# Patient Record
Sex: FEMALE | Race: WHITE
[De-identification: names, ages, dates, MRNs, and addresses within clinical notes are randomized per-mention and may not be internally consistent; named-entity substitution may affect disease eponyms.]

---

## 2020-02-15 ENCOUNTER — HOSPITAL ENCOUNTER (INPATIENT)
Dept: HOSPITAL 4 - SED | Age: 55
LOS: 5 days | Discharge: NURSING, REHAB OR LONG-TERM CARE | DRG: 871 | End: 2020-02-20
Attending: INTERNAL MEDICINE | Admitting: INTERNAL MEDICINE
Payer: COMMERCIAL

## 2020-02-15 VITALS — WEIGHT: 114 LBS | BODY MASS INDEX: 20.98 KG/M2 | HEIGHT: 62 IN

## 2020-02-15 VITALS — SYSTOLIC BLOOD PRESSURE: 135 MMHG

## 2020-02-15 VITALS — SYSTOLIC BLOOD PRESSURE: 98 MMHG

## 2020-02-15 DIAGNOSIS — G80.9: ICD-10-CM

## 2020-02-15 DIAGNOSIS — K21.9: ICD-10-CM

## 2020-02-15 DIAGNOSIS — N13.6: ICD-10-CM

## 2020-02-15 DIAGNOSIS — N28.82: ICD-10-CM

## 2020-02-15 DIAGNOSIS — Z88.0: ICD-10-CM

## 2020-02-15 DIAGNOSIS — Z79.899: ICD-10-CM

## 2020-02-15 DIAGNOSIS — G93.40: ICD-10-CM

## 2020-02-15 DIAGNOSIS — M06.9: ICD-10-CM

## 2020-02-15 DIAGNOSIS — M32.9: ICD-10-CM

## 2020-02-15 DIAGNOSIS — Z90.710: ICD-10-CM

## 2020-02-15 DIAGNOSIS — J15.6: ICD-10-CM

## 2020-02-15 DIAGNOSIS — Z74.01: ICD-10-CM

## 2020-02-15 DIAGNOSIS — A41.9: Primary | ICD-10-CM

## 2020-02-15 DIAGNOSIS — Z87.440: ICD-10-CM

## 2020-02-15 DIAGNOSIS — L89.312: ICD-10-CM

## 2020-02-15 DIAGNOSIS — Z16.30: ICD-10-CM

## 2020-02-15 DIAGNOSIS — Z88.8: ICD-10-CM

## 2020-02-15 LAB
ALBUMIN SERPL BCP-MCNC: 2.5 G/DL (ref 3.4–4.8)
ALT SERPL W P-5'-P-CCNC: 44 U/L (ref 12–78)
AMYLASE SERPL-CCNC: 41 U/L (ref 0–100)
ANION GAP SERPL CALCULATED.3IONS-SCNC: 9 MMOL/L (ref 5–15)
AST SERPL W P-5'-P-CCNC: 18 U/L (ref 10–37)
BASOPHILS NFR BLD MANUAL: 0 % (ref 0–2)
BILIRUB SERPL-MCNC: 0.3 MG/DL (ref 0–1)
BUN SERPL-MCNC: 22 MG/DL (ref 8–21)
CALCIUM SERPL-MCNC: 9.2 MG/DL (ref 8.4–11)
CHLORIDE SERPL-SCNC: 98 MMOL/L (ref 98–107)
CREAT SERPL-MCNC: 0.8 MG/DL (ref 0.55–1.3)
EOSINOPHIL NFR BLD MANUAL: 0 % (ref 0–7)
ERYTHROCYTE [DISTWIDTH] IN BLOOD BY AUTOMATED COUNT: 15.5 % (ref 9–15)
GFR SERPL CREATININE-BSD FRML MDRD: 96 ML/MIN (ref 90–?)
GLUCOSE SERPL-MCNC: 105 MG/DL (ref 70–99)
HCT VFR BLD AUTO: 35.5 % (ref 36–48)
HGB BLD-MCNC: 11.7 G/DL (ref 12–16)
INR PPP: 1.1 (ref 0.8–1.2)
LIPASE SERPL-CCNC: 101 U/L (ref 73–393)
LYMPHOCYTES NFR BLD MANUAL: 22 % (ref 20–46)
MCH RBC QN AUTO: 29 PG (ref 27–31)
MCHC RBC AUTO-ENTMCNC: 33 % (ref 32–36)
MCV RBC AUTO: 87 FL (ref 79–98)
MONOCYTES # BLD MANUAL: 21 % (ref 0–11)
NEUTS BAND NFR BLD MANUAL: 4 % (ref 0–6)
PLATELET # BLD AUTO: 306 K/UL (ref 130–430)
POTASSIUM SERPL-SCNC: 3.8 MMOL/L (ref 3.5–5.1)
PROTHROMBIN TIME: 10.7 SECS (ref 9.5–12.5)
RBC # BLD AUTO: 4.07 MIL/UL (ref 4.2–6.2)
SODIUM SERPLBLD-SCNC: 131 MMOL/L (ref 136–145)
VARIANT LYMPHS NFR BLD MANUAL: 1 % (ref 0–0)
WBC # BLD AUTO: 10.4 K/UL (ref 4.8–10.8)

## 2020-02-15 PROCEDURE — G0378 HOSPITAL OBSERVATION PER HR: HCPCS

## 2020-02-15 RX ADMIN — SODIUM CHLORIDE SCH MLS/HR: 9 INJECTION, SOLUTION INTRAVENOUS at 18:34

## 2020-02-15 RX ADMIN — IPRATROPIUM BROMIDE AND ALBUTEROL SULFATE SCH ML: .5; 3 SOLUTION RESPIRATORY (INHALATION) at 23:31

## 2020-02-15 NOTE — NUR
ADMISSION NOTE

Received patient from ER via gurney. Patient admitted with diagnosis of PNA. Patient is 
awake, alert, oriented X 0. Patient oriented to hospital room, call light, toileting, pain 
management and safety-teach back done. Patient informed that BRET will be HER nurse and 
that their room number is 122B. Personal belongings checked and Belongings List documented. 
Call light within reach.

## 2020-02-15 NOTE — NUR
Gave report to RENEA Arias. Patient's summary report, code status form and 
belongins all completed and printed. Unable to straight cath pt to obtain urine 
specimen. Patient in bed, awake and resting. No s/s of acute distress noted. 
Patient unable to be transferred at this time, waiting for unit to accept 
patient. endorsed care to RENEA Arias.

## 2020-02-15 NOTE — NUR
Transfer to telemtry unit via ACLS protocol. Licensed nurse present. IV present 
no signs or symptoms of infiltration.

## 2020-02-15 NOTE — NUR
attempted to straight cath for urine sample x2 by two different RN's including 
charge rn. attempts unsuccessful. per md, let patient rest and attempt later.

## 2020-02-15 NOTE — NUR
Received sbar report from RENEA Lagos. Pt in no signs of acute distress or 
discomfort noted. Will cont to monitor pt.

## 2020-02-15 NOTE — NUR
Pt in bed with eyes open resting comfortably. No signs of acute distress or 
discomfort noted. Will cont to monitor pt.

## 2020-02-15 NOTE — NUR
Report received from RENEA Flores, BIB ambulance from [facility]. Patient had 
fever of 101.6 at 1130 today. patient given tyelnol at [facility]. pt history 
of pneumonia, bronchitis and UTI. Patient afebrile on arrival. No s/s of acute 
distress noted. Will continue to monitor.

## 2020-02-16 VITALS — SYSTOLIC BLOOD PRESSURE: 108 MMHG

## 2020-02-16 VITALS — SYSTOLIC BLOOD PRESSURE: 116 MMHG

## 2020-02-16 VITALS — SYSTOLIC BLOOD PRESSURE: 97 MMHG

## 2020-02-16 VITALS — SYSTOLIC BLOOD PRESSURE: 93 MMHG

## 2020-02-16 VITALS — SYSTOLIC BLOOD PRESSURE: 91 MMHG

## 2020-02-16 VITALS — SYSTOLIC BLOOD PRESSURE: 98 MMHG

## 2020-02-16 LAB
ALBUMIN SERPL BCP-MCNC: 2.1 G/DL (ref 3.4–4.8)
ALT SERPL W P-5'-P-CCNC: 34 U/L (ref 12–78)
ANION GAP SERPL CALCULATED.3IONS-SCNC: 9 MMOL/L (ref 5–15)
APPEARANCE UR: (no result)
AST SERPL W P-5'-P-CCNC: 22 U/L (ref 10–37)
BASOPHILS # BLD AUTO: 0.2 K/UL (ref 0–0.2)
BASOPHILS NFR BLD AUTO: 1.6 % (ref 0–2)
BILIRUB SERPL-MCNC: 0.2 MG/DL (ref 0–1)
BILIRUB UR QL STRIP: NEGATIVE
BUN SERPL-MCNC: 14 MG/DL (ref 8–21)
CALCIUM SERPL-MCNC: 8.3 MG/DL (ref 8.4–11)
CHLORIDE SERPL-SCNC: 105 MMOL/L (ref 98–107)
COLOR UR: YELLOW
CREAT SERPL-MCNC: 0.53 MG/DL (ref 0.55–1.3)
EOSINOPHIL # BLD AUTO: 0.1 K/UL (ref 0–0.4)
EOSINOPHIL NFR BLD AUTO: 0.6 % (ref 0–4)
ERYTHROCYTE [DISTWIDTH] IN BLOOD BY AUTOMATED COUNT: 15.4 % (ref 9–15)
GFR SERPL CREATININE-BSD FRML MDRD: 154 ML/MIN (ref 90–?)
GLUCOSE SERPL-MCNC: 92 MG/DL (ref 70–99)
GLUCOSE UR STRIP-MCNC: NEGATIVE MG/DL
HCT VFR BLD AUTO: 34.2 % (ref 36–48)
HGB BLD-MCNC: 11.1 G/DL (ref 12–16)
HGB UR QL STRIP: (no result)
KETONES UR STRIP-MCNC: NEGATIVE MG/DL
LEUKOCYTE ESTERASE UR QL STRIP: (no result)
LYMPHOCYTES # BLD AUTO: 1.8 K/UL (ref 1–5.5)
LYMPHOCYTES NFR BLD AUTO: 16.9 % (ref 20.5–51.5)
MCH RBC QN AUTO: 29 PG (ref 27–31)
MCHC RBC AUTO-ENTMCNC: 32 % (ref 32–36)
MCV RBC AUTO: 89 FL (ref 79–98)
MONOCYTES # BLD MANUAL: 19.4 % (ref 1.7–9.3)
MONOCYTES # BLD MANUAL: 2.1 K/UL (ref 0–1)
NEUTROPHILS # BLD AUTO: 6.6 K/UL (ref 1.8–7.7)
NEUTROPHILS NFR BLD AUTO: 61.5 % (ref 40–70)
NITRITE UR QL STRIP: NEGATIVE
PH UR STRIP: 6 [PH] (ref 5–8)
PLATELET # BLD AUTO: 274 K/UL (ref 130–430)
POTASSIUM SERPL-SCNC: 3.8 MMOL/L (ref 3.5–5.1)
PROT UR QL STRIP: NEGATIVE
RBC # BLD AUTO: 3.84 MIL/UL (ref 4.2–6.2)
SODIUM SERPLBLD-SCNC: 137 MMOL/L (ref 136–145)
SP GR UR STRIP: 1.01 (ref 1–1.03)
UROBILINOGEN UR STRIP-MCNC: 0.2 MG/DL (ref 0.2–1)
WBC # BLD AUTO: 10.8 K/UL (ref 4.8–10.8)

## 2020-02-16 RX ADMIN — PREDNISONE SCH MG: 5 TABLET ORAL at 11:11

## 2020-02-16 RX ADMIN — ACETAMINOPHEN PRN MG: 325 TABLET ORAL at 15:44

## 2020-02-16 RX ADMIN — Medication SCH UNIT: at 11:11

## 2020-02-16 RX ADMIN — IPRATROPIUM BROMIDE AND ALBUTEROL SULFATE SCH ML: .5; 3 SOLUTION RESPIRATORY (INHALATION) at 22:40

## 2020-02-16 RX ADMIN — HYDROXYCHLOROQUINE SULFATE SCH MG: 200 TABLET ORAL at 09:00

## 2020-02-16 RX ADMIN — Medication SCH MG: at 21:58

## 2020-02-16 RX ADMIN — Medication SCH MG: at 15:37

## 2020-02-16 RX ADMIN — IPRATROPIUM BROMIDE AND ALBUTEROL SULFATE SCH ML: .5; 3 SOLUTION RESPIRATORY (INHALATION) at 07:51

## 2020-02-16 RX ADMIN — IPRATROPIUM BROMIDE AND ALBUTEROL SULFATE SCH ML: .5; 3 SOLUTION RESPIRATORY (INHALATION) at 11:26

## 2020-02-16 RX ADMIN — IPRATROPIUM BROMIDE AND ALBUTEROL SULFATE SCH ML: .5; 3 SOLUTION RESPIRATORY (INHALATION) at 19:16

## 2020-02-16 RX ADMIN — OXYBUTYNIN CHLORIDE SCH MG: 5 TABLET ORAL at 11:10

## 2020-02-16 RX ADMIN — AZITHROMYCIN DIHYDRATE SCH MG: 250 TABLET, FILM COATED ORAL at 11:11

## 2020-02-16 RX ADMIN — Medication SCH TAB: at 11:11

## 2020-02-16 RX ADMIN — ACETAMINOPHEN PRN MG: 325 TABLET ORAL at 22:55

## 2020-02-16 RX ADMIN — IPRATROPIUM BROMIDE AND ALBUTEROL SULFATE SCH ML: .5; 3 SOLUTION RESPIRATORY (INHALATION) at 15:08

## 2020-02-16 RX ADMIN — OXYCODONE HYDROCHLORIDE AND ACETAMINOPHEN SCH MG: 500 TABLET ORAL at 11:11

## 2020-02-16 RX ADMIN — SODIUM CHLORIDE SCH MLS/HR: 9 INJECTION, SOLUTION INTRAVENOUS at 15:41

## 2020-02-16 RX ADMIN — SODIUM CHLORIDE SCH MLS/HR: 9 INJECTION, SOLUTION INTRAVENOUS at 04:31

## 2020-02-16 RX ADMIN — MULTIVITAMIN TABLET SCH TAB: TABLET at 11:11

## 2020-02-16 RX ADMIN — Medication SCH MG: at 11:11

## 2020-02-16 RX ADMIN — Medication SCH TAB: at 21:58

## 2020-02-16 RX ADMIN — Medication SCH CAP: at 11:11

## 2020-02-16 RX ADMIN — Medication SCH CAP: at 21:58

## 2020-02-16 RX ADMIN — IPRATROPIUM BROMIDE AND ALBUTEROL SULFATE SCH ML: .5; 3 SOLUTION RESPIRATORY (INHALATION) at 04:09

## 2020-02-16 NOTE — NUR
Nutrition Update



Reynold Scale 13 noted.

Pt admitted for PNA

Diet: Regular

BMI: 20.9 kg/m2



RD to follow per nutrition care standards.

## 2020-02-16 NOTE — NUR
UNABLE TO INSERT ALFREDO CATH USING 14FR CATH. WILL ENDORSE TO NIGHT NURSE. 

DR WAGNER (URO) WILL SEE PT IN AM.

## 2020-02-16 NOTE — NUR
ID Consultation Paged



Reason for consultation: PNA

Was consult called: Yes

Person who was notified: Charla

Consulting Physician: Dr Olson

Consultant Phone Number: [phone]

Consultant Specialty: Infectious Disease

Ordered By: Dr Martinez

## 2020-02-16 NOTE — NUR
CONSULTATION PAGED/CALLED

Reason for Consultation: []   HYDRONEPHROSIS

Person Who was Notified: []  JOSELYN

Consulting Physician: []   MARTIN MARCELINO

Consultant Specialty: []  UROLOGY

Ordering Physician: []  DR LLOYD

## 2020-02-16 NOTE — NUR
Fever/Dr. Crow at bedside

Patient had temperature of 100.7. PRN Tylenol given. Educated the action and side effects of 
medications. Patient half cup of chocolate pudding and tolerated well. Dr. Crow at bedside 
and inserted 16 F orozco catheter with 200 ml of immediate urine return. Urine sample sent to 
lab. Informed MD about patient's low BP. New orders received from MD.

## 2020-02-16 NOTE — NUR
Uro Consultation Paged



Reason for consultation: UTI

Was consult called: Yes

Person who was notified: Charla

Consulting Physician: Dr Gomes

Consultant Phone Number: [phone]

Consultant Specialty: Urology

Ordered By: Dr Martinez

## 2020-02-16 NOTE — NUR
CLOSING NOTES

Patient is resting in bed, eyes closed, breathing evenly and nonlabored on 2L of oxygen via 
NC. Needs met throughout the shift. No s/s of distress at this time, fall/safety/aspiration 
precautions, will endorse care to morning shift RN.

## 2020-02-16 NOTE — NUR
UNABLE TO INSERT ALFREDO, DR. GOMES UNABLE TO ACCEPT PATIENT

Patient is resting in bed, awake, breathing evenly and nonlabored on 2L of oxygen via NC. 
Educated patient on need for alfredo catheter, patient unable to state understanding due to 
cognitive limitations. Attempt to insert alfredo catheter to patient unsuccessful. Spoke with 
Dr. Gomes, he is unable to accept patient for urology consult, he referred Dr. Abdoulaye Crow 
instead. No s/s of distress at this time, fall/safety/aspiration precautions, will continue 
to monitor. No complaints No complaints

## 2020-02-16 NOTE — NUR
ROUNDS

Patient is resting in bed, eyes closed, breathing evenly and nonlabored on 2L of oxygen via 
NC. No s/s of distress at this time, fall/safety/aspiration precautions, will continue to 
monitor.

## 2020-02-16 NOTE — NUR
IVF STARTED, NEW IV STARTED

Patient is resting in bed, eyes closed, breathing evenly and nonlabored on 2L of oxygen via 
NC. New IV started on the right hand 22g, IVF started and running, patient is tolerating it 
well. Patient has and IV on the left wrist 22g SL. No s/s of distress at this time, 
fall/safety/aspiration precautions, will continue to monitor.

## 2020-02-16 NOTE — NUR
Medications

given, crushed with applesauce. Educated the action and side effects of medications. Patient 
tolerated well and ate 1 applesauce. Hygiene care provided for patient. Patient screams when 
turned. Call light with the patient. Safety precautions in place.

## 2020-02-16 NOTE — NUR
Pulmo Consultation Paged



Reason for consultation: Bilateral PNA

Was consult called: Yes

Person who was notified: Charla

Consulting Physician: Dr Lau

Consultant Phone Number: [phone]

Consultant Specialty: Pulmo

Ordered By: Dr Martinez

## 2020-02-16 NOTE — NUR
CLOSING: PT HAS BEEN STABLE, EXCEPT FOR FEVER .8 . PT ABLE TO EAT BETTER WITH PUREED 
DIET.  WILL ENDORSE TO NIGHT NURSE. MEDS GIVEN CRUSHED WITH APPLESAUCE. 

-------------------------------------------------------------------------------

Addendum: 02/16/20 at 1835 by Ovidio Rivera RN

-------------------------------------------------------------------------------

PT GIVEN IV ANTIBIOTICS AS ORDERED.

## 2020-02-16 NOTE — NUR
Opening notes

Received report. Patient is resting in bed. No signs of distress noted 2 L NC. Breathing 
even and unlabored. IV patent and intact, no signs of infiltration noted. Dr. Crow called 
stating he will come in tonight to see patient. no other needs. Call light with the patient. 
Safety precautions in place.

## 2020-02-17 VITALS — SYSTOLIC BLOOD PRESSURE: 114 MMHG

## 2020-02-17 VITALS — SYSTOLIC BLOOD PRESSURE: 126 MMHG

## 2020-02-17 VITALS — SYSTOLIC BLOOD PRESSURE: 107 MMHG

## 2020-02-17 VITALS — SYSTOLIC BLOOD PRESSURE: 125 MMHG

## 2020-02-17 LAB
ANION GAP SERPL CALCULATED.3IONS-SCNC: 6 MMOL/L (ref 5–15)
BACTERIA URNS QL MICRO: (no result) /HPF
BASOPHILS NFR BLD MANUAL: 0 % (ref 0–2)
BUN SERPL-MCNC: 17 MG/DL (ref 8–21)
CALCIUM SERPL-MCNC: 8.5 MG/DL (ref 8.4–11)
CHLORIDE SERPL-SCNC: 107 MMOL/L (ref 98–107)
CREAT SERPL-MCNC: 0.71 MG/DL (ref 0.55–1.3)
EOSINOPHIL NFR BLD MANUAL: 0 % (ref 0–7)
ERYTHROCYTE [DISTWIDTH] IN BLOOD BY AUTOMATED COUNT: 15 % (ref 9–15)
GFR SERPL CREATININE-BSD FRML MDRD: 110 ML/MIN (ref 90–?)
GLUCOSE SERPL-MCNC: 94 MG/DL (ref 70–99)
HCT VFR BLD AUTO: 31.1 % (ref 36–48)
HGB BLD-MCNC: 10.2 G/DL (ref 12–16)
LYMPHOCYTES NFR BLD MANUAL: 45 % (ref 20–46)
MCH RBC QN AUTO: 29 PG (ref 27–31)
MCHC RBC AUTO-ENTMCNC: 33 % (ref 32–36)
MCV RBC AUTO: 89 FL (ref 79–98)
MONOCYTES # BLD MANUAL: 13 % (ref 0–11)
PLATELET # BLD AUTO: 261 K/UL (ref 130–430)
POTASSIUM SERPL-SCNC: 3.4 MMOL/L (ref 3.5–5.1)
RBC # BLD AUTO: 3.5 MIL/UL (ref 4.2–6.2)
SODIUM SERPLBLD-SCNC: 135 MMOL/L (ref 136–145)
WBC # BLD AUTO: 7.8 K/UL (ref 4.8–10.8)
WBC #/AREA URNS HPF: >100 /HPF (ref 0–3)

## 2020-02-17 RX ADMIN — Medication SCH MG: at 14:41

## 2020-02-17 RX ADMIN — Medication SCH MG: at 21:47

## 2020-02-17 RX ADMIN — Medication SCH CAP: at 08:46

## 2020-02-17 RX ADMIN — IPRATROPIUM BROMIDE AND ALBUTEROL SULFATE SCH ML: .5; 3 SOLUTION RESPIRATORY (INHALATION) at 23:14

## 2020-02-17 RX ADMIN — PREDNISONE SCH MG: 5 TABLET ORAL at 08:46

## 2020-02-17 RX ADMIN — DEXTROSE SCH MLS/HR: 50 INJECTION, SOLUTION INTRAVENOUS at 21:46

## 2020-02-17 RX ADMIN — Medication SCH TAB: at 21:47

## 2020-02-17 RX ADMIN — IPRATROPIUM BROMIDE AND ALBUTEROL SULFATE SCH ML: .5; 3 SOLUTION RESPIRATORY (INHALATION) at 20:04

## 2020-02-17 RX ADMIN — IPRATROPIUM BROMIDE AND ALBUTEROL SULFATE SCH ML: .5; 3 SOLUTION RESPIRATORY (INHALATION) at 15:27

## 2020-02-17 RX ADMIN — SODIUM CHLORIDE SCH MLS/HR: 9 INJECTION, SOLUTION INTRAVENOUS at 21:48

## 2020-02-17 RX ADMIN — Medication SCH UNIT: at 08:46

## 2020-02-17 RX ADMIN — IPRATROPIUM BROMIDE AND ALBUTEROL SULFATE SCH ML: .5; 3 SOLUTION RESPIRATORY (INHALATION) at 07:35

## 2020-02-17 RX ADMIN — SODIUM CHLORIDE SCH MLS/HR: 9 INJECTION, SOLUTION INTRAVENOUS at 14:39

## 2020-02-17 RX ADMIN — Medication SCH MG: at 08:46

## 2020-02-17 RX ADMIN — IPRATROPIUM BROMIDE AND ALBUTEROL SULFATE SCH ML: .5; 3 SOLUTION RESPIRATORY (INHALATION) at 04:05

## 2020-02-17 RX ADMIN — HYDROXYCHLOROQUINE SULFATE SCH MG: 200 TABLET ORAL at 08:45

## 2020-02-17 RX ADMIN — Medication SCH CAP: at 21:47

## 2020-02-17 RX ADMIN — SODIUM CHLORIDE SCH MLS/HR: 9 INJECTION, SOLUTION INTRAVENOUS at 00:52

## 2020-02-17 RX ADMIN — IPRATROPIUM BROMIDE AND ALBUTEROL SULFATE SCH ML: .5; 3 SOLUTION RESPIRATORY (INHALATION) at 11:21

## 2020-02-17 RX ADMIN — OXYCODONE HYDROCHLORIDE AND ACETAMINOPHEN SCH MG: 500 TABLET ORAL at 08:46

## 2020-02-17 RX ADMIN — Medication SCH TAB: at 08:46

## 2020-02-17 RX ADMIN — AZITHROMYCIN DIHYDRATE SCH MG: 250 TABLET, FILM COATED ORAL at 08:45

## 2020-02-17 RX ADMIN — OXYBUTYNIN CHLORIDE SCH MG: 5 TABLET ORAL at 08:46

## 2020-02-17 RX ADMIN — MULTIVITAMIN TABLET SCH TAB: TABLET at 08:46

## 2020-02-17 NOTE — NUR
Closing notes

Patient sleeping at this time. No signs of distress noted. Breathing even and unlabored on 2 
L NC. IV patent and intact, infusing fluids. Goode catheter in place, draining yellow urine. 
All needs met throughout the shift. Call light with the patient. Safety precautions in 
place. Will endorse care to day shift RN.

## 2020-02-17 NOTE — NUR
Note

Pt resting in bed with tele unit attached and intact all shift. Pt was checked on q1' and 
PRN all shift for needs and care. IV in right hand intact and patent infusing IVF's well. 
Goode catheter intact and draining. No needs noted. Report given to NOC RN for continuation 
of care. Call light within reach. Pt was maintained with isolation precautions all shift 
since 0915am.

## 2020-02-17 NOTE — NUR
Opening note

Received patient resting in bed awake, AO x1 to name, she is nonverbal. Respirations are 22 
on 2L NC. IVF is infusing via IV to right hand, IV to left wrist is saline locked. Bed rails 
up 3x, bed is locked in lowest position, and bed alarm on. She has a orozco catheter and 
drainage bag is free of dependent loops and draining to gravity.

## 2020-02-17 NOTE — NUR
Dietitian Recommendations 

*Continue: pureed diet per MD orders. 

ONS Ensure Enlive comes standard w/ diet and provides additional 1050 

kcal, 60 gm protein daily.



Please see Nutritional Assessment for details.

FABIAN, RD

## 2020-02-17 NOTE — NUR
Note

Pt sitting up in bed being assisted in eating her breakfast. IV in right hand intact and 
patent infusing IVF's well. Left wrist IV intact and patent. Tele unit attached and intact 
at this time. No needs noted. Call light within reach.

## 2020-02-17 NOTE — NUR
Resting

Patient resting in bed. No signs of distress noted. Breathing even and unlabored. IVF 
infusing well. Temp at this time 99.0. Influenza screen collected and sent to lab. Call 
light with the patient. Safety precautions in place.

## 2020-02-17 NOTE — NUR
Hygiene care provided

Patient tolerated well. No signs of distress noted. Breathing even and unlabored. IVF 
infusing well. Call light with the patient. Safety precautions in place.

## 2020-02-17 NOTE — NUR
Note

Dr Martinez was paged at 0920am to notify MD that pt testd positive for MRSA in nares. Waiting 
for call back.

## 2020-02-17 NOTE — NUR
Medications

Patient was given due medications crushed and mixed in applesauce, patient was cooperative 
and willing to take meds.  Maxipime, antibiotic administered and infusing well. Patient 
repositioned for comfort.

## 2020-02-17 NOTE — NUR
Note

Pt resting in bed, no needs noted at this time. Call light within reach. IVF's infusing well 
at this time through right hand IV site. Call light within reach all shift.

## 2020-02-18 VITALS — SYSTOLIC BLOOD PRESSURE: 106 MMHG

## 2020-02-18 VITALS — SYSTOLIC BLOOD PRESSURE: 99 MMHG

## 2020-02-18 VITALS — SYSTOLIC BLOOD PRESSURE: 103 MMHG

## 2020-02-18 VITALS — SYSTOLIC BLOOD PRESSURE: 130 MMHG

## 2020-02-18 VITALS — SYSTOLIC BLOOD PRESSURE: 94 MMHG

## 2020-02-18 LAB
ALBUMIN SERPL BCP-MCNC: 2 G/DL (ref 3.4–4.8)
ALT SERPL W P-5'-P-CCNC: 71 U/L (ref 12–78)
ANION GAP SERPL CALCULATED.3IONS-SCNC: 9 MMOL/L (ref 5–15)
AST SERPL W P-5'-P-CCNC: 54 U/L (ref 10–37)
BASOPHILS # BLD AUTO: 0 K/UL (ref 0–0.2)
BASOPHILS NFR BLD AUTO: 0.1 % (ref 0–2)
BILIRUB SERPL-MCNC: 0.2 MG/DL (ref 0–1)
BUN SERPL-MCNC: 10 MG/DL (ref 8–21)
CALCIUM SERPL-MCNC: 8.7 MG/DL (ref 8.4–11)
CHLORIDE SERPL-SCNC: 105 MMOL/L (ref 98–107)
CREAT SERPL-MCNC: 0.64 MG/DL (ref 0.55–1.3)
EOSINOPHIL # BLD AUTO: 0.1 K/UL (ref 0–0.4)
EOSINOPHIL NFR BLD AUTO: 1.1 % (ref 0–4)
ERYTHROCYTE [DISTWIDTH] IN BLOOD BY AUTOMATED COUNT: 15.6 % (ref 9–15)
GFR SERPL CREATININE-BSD FRML MDRD: 124 ML/MIN (ref 90–?)
GLUCOSE SERPL-MCNC: 97 MG/DL (ref 70–99)
HCT VFR BLD AUTO: 30.1 % (ref 36–48)
HGB BLD-MCNC: 9.8 G/DL (ref 12–16)
LYMPHOCYTES # BLD AUTO: 2.1 K/UL (ref 1–5.5)
LYMPHOCYTES NFR BLD AUTO: 23.5 % (ref 20.5–51.5)
MCH RBC QN AUTO: 29 PG (ref 27–31)
MCHC RBC AUTO-ENTMCNC: 33 % (ref 32–36)
MCV RBC AUTO: 88 FL (ref 79–98)
MONOCYTES # BLD MANUAL: 1 K/UL (ref 0–1)
MONOCYTES # BLD MANUAL: 11.6 % (ref 1.7–9.3)
NEUTROPHILS # BLD AUTO: 5.7 K/UL (ref 1.8–7.7)
NEUTROPHILS NFR BLD AUTO: 63.7 % (ref 40–70)
PLATELET # BLD AUTO: 281 K/UL (ref 130–430)
POTASSIUM SERPL-SCNC: 3.5 MMOL/L (ref 3.5–5.1)
RBC # BLD AUTO: 3.42 MIL/UL (ref 4.2–6.2)
SODIUM SERPLBLD-SCNC: 138 MMOL/L (ref 136–145)
WBC # BLD AUTO: 9 K/UL (ref 4.8–10.8)

## 2020-02-18 RX ADMIN — PREDNISONE SCH MG: 5 TABLET ORAL at 09:26

## 2020-02-18 RX ADMIN — SODIUM CHLORIDE SCH MLS/HR: 450 INJECTION, SOLUTION INTRAVENOUS at 12:28

## 2020-02-18 RX ADMIN — Medication SCH TAB: at 20:43

## 2020-02-18 RX ADMIN — DEXTROSE SCH MLS/HR: 50 INJECTION, SOLUTION INTRAVENOUS at 09:26

## 2020-02-18 RX ADMIN — ACETAMINOPHEN PRN MG: 325 TABLET ORAL at 04:41

## 2020-02-18 RX ADMIN — DEXTROSE SCH MLS/HR: 50 INJECTION, SOLUTION INTRAVENOUS at 20:42

## 2020-02-18 RX ADMIN — MUPIROCIN SCH GM: 20 OINTMENT TOPICAL at 20:42

## 2020-02-18 RX ADMIN — MULTIVITAMIN TABLET SCH TAB: TABLET at 09:26

## 2020-02-18 RX ADMIN — OXYCODONE HYDROCHLORIDE AND ACETAMINOPHEN SCH MG: 500 TABLET ORAL at 09:27

## 2020-02-18 RX ADMIN — Medication SCH MG: at 15:17

## 2020-02-18 RX ADMIN — SODIUM CHLORIDE SCH MLS/HR: 9 INJECTION, SOLUTION INTRAVENOUS at 06:32

## 2020-02-18 RX ADMIN — Medication SCH MG: at 20:43

## 2020-02-18 RX ADMIN — HYDROXYCHLOROQUINE SULFATE SCH MG: 200 TABLET ORAL at 12:27

## 2020-02-18 RX ADMIN — Medication SCH TAB: at 09:27

## 2020-02-18 RX ADMIN — IPRATROPIUM BROMIDE AND ALBUTEROL SULFATE SCH ML: .5; 3 SOLUTION RESPIRATORY (INHALATION) at 23:35

## 2020-02-18 RX ADMIN — IPRATROPIUM BROMIDE AND ALBUTEROL SULFATE SCH ML: .5; 3 SOLUTION RESPIRATORY (INHALATION) at 07:40

## 2020-02-18 RX ADMIN — IPRATROPIUM BROMIDE AND ALBUTEROL SULFATE SCH ML: .5; 3 SOLUTION RESPIRATORY (INHALATION) at 15:41

## 2020-02-18 RX ADMIN — OXYBUTYNIN CHLORIDE SCH MG: 5 TABLET ORAL at 09:26

## 2020-02-18 RX ADMIN — IPRATROPIUM BROMIDE AND ALBUTEROL SULFATE SCH ML: .5; 3 SOLUTION RESPIRATORY (INHALATION) at 04:07

## 2020-02-18 RX ADMIN — Medication SCH CAP: at 09:27

## 2020-02-18 RX ADMIN — SODIUM CHLORIDE SCH MLS/HR: 9 INJECTION, SOLUTION INTRAVENOUS at 09:26

## 2020-02-18 RX ADMIN — Medication SCH CAP: at 20:43

## 2020-02-18 RX ADMIN — Medication SCH MG: at 09:27

## 2020-02-18 RX ADMIN — Medication SCH UNIT: at 09:26

## 2020-02-18 RX ADMIN — IPRATROPIUM BROMIDE AND ALBUTEROL SULFATE SCH ML: .5; 3 SOLUTION RESPIRATORY (INHALATION) at 11:46

## 2020-02-18 NOTE — NUR
Notes- In bed, awake. no distress noted, pt nods when ask question. denies any pain at this 
time. IVF infusing well.Bed alarm on. Will continue to monitor.

## 2020-02-18 NOTE — NUR
Initial notes- In bed, awake, non verbal. feeder, afebrile at this time. IVF infusing well. 
Goode cath draining well with clear yellow urine. Maintain on contact isolation for mrsa 
nares. bed alarm on. will continue to monitor.

## 2020-02-18 NOTE — NUR
Med pass



Pt awake, no s/s distress noted.  HOB elevated.  Meds crushed and passed as scheduled.  Oral 
care provided and suctioned prn.  Pt tolerated well.  To monitor.

## 2020-02-18 NOTE — NUR
Elevated HR, temp 99.3

Patient's HR increased to 130's on telemonitor. She has an oral temp of 99.3 and she also 
received a breathing treatment at 0407. She was administered Tylenol and cooling measures 
provided. Will continue to monitor heart rate and temp.

## 2020-02-18 NOTE — NUR
closing note

Patient is calm, HR decreased to 113, afebrile. Repositioned for comfort.. IVF infusing 
well. Safety precautions in place, Needs met throughout shift, will endorse care.

## 2020-02-19 VITALS — SYSTOLIC BLOOD PRESSURE: 101 MMHG

## 2020-02-19 VITALS — SYSTOLIC BLOOD PRESSURE: 89 MMHG

## 2020-02-19 VITALS — SYSTOLIC BLOOD PRESSURE: 116 MMHG

## 2020-02-19 RX ADMIN — OXYCODONE HYDROCHLORIDE AND ACETAMINOPHEN SCH MG: 500 TABLET ORAL at 09:08

## 2020-02-19 RX ADMIN — Medication SCH TAB: at 22:01

## 2020-02-19 RX ADMIN — IPRATROPIUM BROMIDE AND ALBUTEROL SULFATE SCH ML: .5; 3 SOLUTION RESPIRATORY (INHALATION) at 03:00

## 2020-02-19 RX ADMIN — SODIUM CHLORIDE SCH MLS/HR: 450 INJECTION, SOLUTION INTRAVENOUS at 22:04

## 2020-02-19 RX ADMIN — SODIUM CHLORIDE SCH MLS/HR: 450 INJECTION, SOLUTION INTRAVENOUS at 06:16

## 2020-02-19 RX ADMIN — IPRATROPIUM BROMIDE AND ALBUTEROL SULFATE SCH ML: .5; 3 SOLUTION RESPIRATORY (INHALATION) at 07:37

## 2020-02-19 RX ADMIN — PREDNISONE SCH MG: 5 TABLET ORAL at 09:09

## 2020-02-19 RX ADMIN — IPRATROPIUM BROMIDE AND ALBUTEROL SULFATE SCH ML: .5; 3 SOLUTION RESPIRATORY (INHALATION) at 20:01

## 2020-02-19 RX ADMIN — MUPIROCIN SCH GM: 20 OINTMENT TOPICAL at 22:01

## 2020-02-19 RX ADMIN — IPRATROPIUM BROMIDE AND ALBUTEROL SULFATE SCH ML: .5; 3 SOLUTION RESPIRATORY (INHALATION) at 10:56

## 2020-02-19 RX ADMIN — OXYBUTYNIN CHLORIDE SCH MG: 5 TABLET ORAL at 09:08

## 2020-02-19 RX ADMIN — IPRATROPIUM BROMIDE AND ALBUTEROL SULFATE SCH ML: .5; 3 SOLUTION RESPIRATORY (INHALATION) at 15:14

## 2020-02-19 RX ADMIN — Medication SCH MG: at 09:08

## 2020-02-19 RX ADMIN — Medication SCH UNIT: at 09:08

## 2020-02-19 RX ADMIN — DEXTROSE SCH MLS/HR: 50 INJECTION, SOLUTION INTRAVENOUS at 22:01

## 2020-02-19 RX ADMIN — MUPIROCIN SCH GM: 20 OINTMENT TOPICAL at 09:10

## 2020-02-19 RX ADMIN — HYDROXYCHLOROQUINE SULFATE SCH MG: 200 TABLET ORAL at 09:00

## 2020-02-19 RX ADMIN — Medication SCH CAP: at 09:08

## 2020-02-19 RX ADMIN — Medication SCH TAB: at 09:08

## 2020-02-19 RX ADMIN — DEXTROSE SCH MLS/HR: 50 INJECTION, SOLUTION INTRAVENOUS at 09:08

## 2020-02-19 RX ADMIN — Medication SCH MG: at 16:19

## 2020-02-19 RX ADMIN — MULTIVITAMIN TABLET SCH TAB: TABLET at 09:08

## 2020-02-19 RX ADMIN — Medication SCH CAP: at 22:01

## 2020-02-19 RX ADMIN — Medication SCH MG: at 22:01

## 2020-02-19 NOTE — NUR
Medications

given, crushed with applesauce. Educated the action and side effects of medications. Patient 
tolerated well. Patient IV infiltrated. New IV inserted into left wrist 22 gauge. Resumed 
fluids. Call light with the patient. Safety precautions in place.

## 2020-02-19 NOTE — NUR
Rounds



Pt asleep, respiraitions even and unlabored.  O2 2L via NC.  Call light within reach.  Pt 
repositioned.  To monitor.

## 2020-02-19 NOTE — NUR
Opening notes

Received report. Patient is resting in bed. No signs of distress noted. Breathing even and 
unlabored. IV patent and intact, infusing fluids. Goode catheter in place, draining yellow 
urine. No needs at this time. Call light with the patient. Safety precautions in place.

## 2020-02-20 VITALS — SYSTOLIC BLOOD PRESSURE: 110 MMHG

## 2020-02-20 VITALS — SYSTOLIC BLOOD PRESSURE: 109 MMHG

## 2020-02-20 VITALS — SYSTOLIC BLOOD PRESSURE: 95 MMHG

## 2020-02-20 RX ADMIN — DEXTROSE SCH MLS/HR: 50 INJECTION, SOLUTION INTRAVENOUS at 09:30

## 2020-02-20 RX ADMIN — IPRATROPIUM BROMIDE AND ALBUTEROL SULFATE SCH ML: .5; 3 SOLUTION RESPIRATORY (INHALATION) at 00:35

## 2020-02-20 RX ADMIN — IPRATROPIUM BROMIDE AND ALBUTEROL SULFATE SCH ML: .5; 3 SOLUTION RESPIRATORY (INHALATION) at 16:35

## 2020-02-20 RX ADMIN — MULTIVITAMIN TABLET SCH TAB: TABLET at 10:32

## 2020-02-20 RX ADMIN — Medication SCH MG: at 10:32

## 2020-02-20 RX ADMIN — IPRATROPIUM BROMIDE AND ALBUTEROL SULFATE SCH ML: .5; 3 SOLUTION RESPIRATORY (INHALATION) at 12:00

## 2020-02-20 RX ADMIN — OXYCODONE HYDROCHLORIDE AND ACETAMINOPHEN SCH MG: 500 TABLET ORAL at 10:32

## 2020-02-20 RX ADMIN — IPRATROPIUM BROMIDE AND ALBUTEROL SULFATE SCH ML: .5; 3 SOLUTION RESPIRATORY (INHALATION) at 03:01

## 2020-02-20 RX ADMIN — Medication SCH UNIT: at 10:32

## 2020-02-20 RX ADMIN — IPRATROPIUM BROMIDE AND ALBUTEROL SULFATE SCH ML: .5; 3 SOLUTION RESPIRATORY (INHALATION) at 08:01

## 2020-02-20 RX ADMIN — Medication SCH TAB: at 10:32

## 2020-02-20 RX ADMIN — Medication SCH MG: at 16:16

## 2020-02-20 RX ADMIN — Medication SCH CAP: at 10:33

## 2020-02-20 RX ADMIN — OXYBUTYNIN CHLORIDE SCH MG: 5 TABLET ORAL at 10:32

## 2020-02-20 RX ADMIN — MUPIROCIN SCH GM: 20 OINTMENT TOPICAL at 10:33

## 2020-02-20 RX ADMIN — PREDNISONE SCH MG: 5 TABLET ORAL at 10:32

## 2020-02-20 RX ADMIN — HYDROXYCHLOROQUINE SULFATE SCH MG: 200 TABLET ORAL at 10:32

## 2020-02-20 NOTE — NUR
Hygiene care

provided. Patient tolerated well. No other needs. No signs of distress noted. Breathing even 
and unlabored  on 2 L NC. Call light with the patient. Safety precautions in place.

## 2020-02-20 NOTE — NUR
Resting

Patient is resting in bed, no signs of distress noted. Breathing even and unlabored. IVF 
infusing well. No needs. Call light with the patient. Safety precautions in place.

## 2020-02-20 NOTE — NUR
Sleeping

No signs of distress noted. Breathing even and unlabored 2 L NC. IVF infusing well. Call 
light with the patient. Safety precautions in place.

## 2020-02-20 NOTE — NUR
Closing notes

Patient is resting in bed. No signs of distress noted. Breathing even and unlabored. IV 
patent and intact, infusing fluids. Goode catheter in place, draining urine. All needs met 
throughout the shift. Call light with the patient. Safety precautions in place. Will endorse 
care to day shift RN.

## 2020-02-20 NOTE — NUR
Discharge Planning: 

DCP faxed pt referral to [facility] (f [phone] p [phone]) DCP to follow up

-------------------------------------------------------------------------------

Addendum: 02/20/20 at 1600 by Lyly LUKE

-------------------------------------------------------------------------------

Patient discharging to [facility] (f [phone] p [phone]) Rm 28B, transportation 
arrange with [facility] ([phone])  6:00pm P/U pateint packet taken to nurse station nurse 
made aware.

## 2020-02-20 NOTE — NUR
DC PLANNING

Called & spoke w Dr Mcuqeen, gave phone order to dc back to SNF. Called pt emergency contact 
Uncle Gerald Song, ph [phone], call not going through. Called & spoke w Rossana @ 
[facility], ph [phone], Bill is emergency contact no other#. Sudha Ga is secondary 
contact. Sudha Ga is from [facility], ph [phone], called & left Sudha connors 
that pt has order to dc back to [facility] today.